# Patient Record
Sex: FEMALE | Race: WHITE | NOT HISPANIC OR LATINO | Employment: UNEMPLOYED | URBAN - METROPOLITAN AREA
[De-identification: names, ages, dates, MRNs, and addresses within clinical notes are randomized per-mention and may not be internally consistent; named-entity substitution may affect disease eponyms.]

---

## 2020-03-15 PROBLEM — R74.8 ELEVATED LIVER ENZYMES: Status: ACTIVE | Noted: 2020-03-15

## 2020-03-15 PROBLEM — N32.89 BLADDER MASS: Status: ACTIVE | Noted: 2020-03-15

## 2020-03-15 PROBLEM — D62 ACUTE BLOOD LOSS ANEMIA: Status: ACTIVE | Noted: 2020-03-15

## 2020-03-15 PROBLEM — R31.0 GROSS HEMATURIA: Status: ACTIVE | Noted: 2020-03-15

## 2020-03-16 PROBLEM — Z98.84 H/O GASTRIC BYPASS: Status: ACTIVE | Noted: 2020-03-16

## 2020-03-16 PROBLEM — Z95.1 S/P CABG (CORONARY ARTERY BYPASS GRAFT): Status: ACTIVE | Noted: 2020-03-16

## 2020-03-17 PROBLEM — C79.51 METASTASIS TO BONE: Status: ACTIVE | Noted: 2020-03-17

## 2020-03-17 PROBLEM — R82.90 ABNORMAL URINALYSIS: Status: ACTIVE | Noted: 2020-03-17

## 2020-03-17 PROBLEM — R19.7 DIARRHEA OF PRESUMED INFECTIOUS ORIGIN: Status: ACTIVE | Noted: 2020-03-17

## 2020-03-18 PROBLEM — D50.0 IRON DEFICIENCY ANEMIA DUE TO CHRONIC BLOOD LOSS: Status: ACTIVE | Noted: 2020-03-18

## 2020-03-18 PROBLEM — N30.01 ACUTE CYSTITIS WITH HEMATURIA: Status: ACTIVE | Noted: 2020-03-18

## 2020-03-19 PROBLEM — N39.0 E. COLI UTI: Status: ACTIVE | Noted: 2020-03-18

## 2020-03-19 PROBLEM — C67.1 MALIGNANT NEOPLASM OF DOME OF URINARY BLADDER: Status: ACTIVE | Noted: 2020-03-15

## 2020-03-19 PROBLEM — B96.20 E. COLI UTI: Status: ACTIVE | Noted: 2020-03-18

## 2020-03-19 PROBLEM — R19.7 DIARRHEA OF PRESUMED INFECTIOUS ORIGIN: Status: RESOLVED | Noted: 2020-03-17 | Resolved: 2020-03-19

## 2020-03-20 PROBLEM — E55.9 VITAMIN D DEFICIENCY: Status: ACTIVE | Noted: 2020-03-20

## 2020-03-25 PROBLEM — N39.0 E. COLI UTI: Status: RESOLVED | Noted: 2020-03-18 | Resolved: 2020-03-25

## 2020-03-25 PROBLEM — B96.20 E. COLI UTI: Status: RESOLVED | Noted: 2020-03-18 | Resolved: 2020-03-25

## 2020-03-26 PROBLEM — R74.8 ELEVATED LIVER ENZYMES: Status: RESOLVED | Noted: 2020-03-15 | Resolved: 2020-03-26

## 2020-04-02 PROBLEM — E87.1 HYPONATREMIA: Status: ACTIVE | Noted: 2020-04-02

## 2020-04-02 PROBLEM — C67.9: Status: ACTIVE | Noted: 2020-04-02

## 2020-04-12 PROBLEM — R50.9 FEVER: Status: ACTIVE | Noted: 2020-04-12

## 2020-05-31 ENCOUNTER — NURSE TRIAGE (OUTPATIENT)
Dept: ADMINISTRATIVE | Facility: CLINIC | Age: 45
End: 2020-05-31

## 2020-05-31 NOTE — TELEPHONE ENCOUNTER
Gali called to say she is feeling tired today from her chemo last week, and does not want to go in for her Covid 19 test, which is scheduled today at 1500.  She says she lives over an hour away from the testing site / lab, and has to make a trip there every week, 72 hours prior to each chemo infusion.  Then she does the same trip for the chemo infusion weekly two days after testing. She is anxious to know if she can go tomorrow for the test, and infusion center have the results before the next infusion on Tuesday of this  this week.  Just does not feel well enough to go today.  If not, she said she may have to reschedule, but she does not want to do that if they can assure her the results will be available soon enough.  Please contact caller directly with requested information and with any additional care advice at new phone number [phone] as soon as possible Monday morning 06/01/2020.  Message to Dr Jyoti Soto, hem onc.       Reason for Disposition   Nursing judgment    Protocols used: ST NO GUIDELINE OR REFERENCE [id number]

## 2020-07-21 PROBLEM — C79.51 BONE METASTASES: Status: ACTIVE | Noted: 2020-07-21

## 2020-07-30 PROBLEM — E87.1 HYPONATREMIA: Status: RESOLVED | Noted: 2020-04-02 | Resolved: 2020-07-30

## 2020-07-30 PROBLEM — D50.0 IRON DEFICIENCY ANEMIA DUE TO CHRONIC BLOOD LOSS: Status: RESOLVED | Noted: 2020-03-18 | Resolved: 2020-07-30

## 2020-07-30 PROBLEM — R50.9 FEVER: Status: RESOLVED | Noted: 2020-04-12 | Resolved: 2020-07-30

## 2020-07-30 PROBLEM — Z51.12 ENCOUNTER FOR ANTINEOPLASTIC CHEMOTHERAPY AND IMMUNOTHERAPY: Status: ACTIVE | Noted: 2020-07-30

## 2020-07-30 PROBLEM — Z51.11 ENCOUNTER FOR ANTINEOPLASTIC CHEMOTHERAPY AND IMMUNOTHERAPY: Status: ACTIVE | Noted: 2020-07-30

## 2020-07-30 PROBLEM — R31.0 GROSS HEMATURIA: Status: RESOLVED | Noted: 2020-03-15 | Resolved: 2020-07-30

## 2020-07-30 PROBLEM — C79.51 METASTASIS TO BONE: Status: RESOLVED | Noted: 2020-03-17 | Resolved: 2020-07-30

## 2020-08-30 PROBLEM — S32.591S PUBIC RAMUS FRACTURE, RIGHT, SEQUELA: Status: ACTIVE | Noted: 2020-08-30

## 2020-10-17 ENCOUNTER — NURSE TRIAGE (OUTPATIENT)
Dept: ADMINISTRATIVE | Facility: CLINIC | Age: 45
End: 2020-10-17

## 2020-10-17 NOTE — TELEPHONE ENCOUNTER
"  Reason for Disposition   [1] Caller has URGENT medication question about med that PCP or specialist prescribed AND [2] triager unable to answer question    Additional Information   Negative: Drug overdose and triager unable to answer question   Negative: Caller requesting information unrelated to medicine   Negative: Caller requesting a prescription for Strep throat and has a positive culture result   Negative: Rash while taking a medication or within 3 days of stopping it   Negative: Immunization reaction suspected   Negative: [1] Asthma and [2] having symptoms of asthma (cough, wheezing, etc.)   Negative: [1] Influenza symptoms AND [2] anti-viral med prescription request, such as Tamiflu   Negative: [1] Symptom of illness (e.g., headache, abdominal pain, earache, vomiting) AND [2] more than mild   Negative: MORE THAN A DOUBLE DOSE of a prescription or over-the-counter (OTC) drug   Negative: [1] DOUBLE DOSE (an extra dose or lesser amount) of over-the-counter (OTC) drug AND [2] any symptoms (e.g., dizziness, nausea, pain, sleepiness)   Negative: [1] DOUBLE DOSE (an extra dose or lesser amount) of prescription drug AND [2] any symptoms (e.g., dizziness, nausea, pain, sleepiness)   Negative: Took another person's prescription drug   Negative: [1] Pharmacy calling with prescription questions AND [2] triager unable to answer question   Negative: [1] Prescription not at pharmacy AND [2] was prescribed by PCP recently   Negative: [1] Request for URGENT new prescription or refill of "essential" medication (i.e., likelihood of harm to patient if not taken) AND [2] triager unable to fill per unit policy   Negative: Diabetes drug error or overdose (e.g., took wrong type of insulin or took extra dose)   Negative: [1] DOUBLE DOSE (an extra dose or lesser amount) of prescription drug AND [2] NO symptoms (Exception: a double dose of antibiotics)    Protocols used: MEDICATION QUESTION CALL-A-  pt tearfully " states she needs to speak with dr evelia timmons regarding rx. Pt transferred to speak with [facility].

## 2020-12-15 PROBLEM — S12.101A CLOSED NONDISPLACED FRACTURE OF SECOND CERVICAL VERTEBRA: Status: ACTIVE | Noted: 2020-12-15

## 2020-12-15 PROBLEM — S12.600A C7 CERVICAL FRACTURE: Status: ACTIVE | Noted: 2020-12-15
